# Patient Record
Sex: FEMALE | Race: WHITE | Employment: UNEMPLOYED | URBAN - METROPOLITAN AREA
[De-identification: names, ages, dates, MRNs, and addresses within clinical notes are randomized per-mention and may not be internally consistent; named-entity substitution may affect disease eponyms.]

---

## 2018-05-25 ENCOUNTER — APPOINTMENT (OUTPATIENT)
Dept: PHYSICAL THERAPY | Age: 38
End: 2018-05-25

## 2018-06-05 ENCOUNTER — HOSPITAL ENCOUNTER (OUTPATIENT)
Dept: PHYSICAL THERAPY | Age: 38
Discharge: HOME | End: 2018-06-05
Payer: COMMERCIAL

## 2018-06-05 PROCEDURE — 97161 PT EVAL LOW COMPLEX 20 MIN: CPT

## 2018-06-05 PROCEDURE — 97110 THERAPEUTIC EXERCISES: CPT

## 2018-06-05 PROCEDURE — 97530 THERAPEUTIC ACTIVITIES: CPT

## 2018-06-05 PROCEDURE — 97140 MANUAL THERAPY 1/> REGIONS: CPT

## 2018-06-05 NOTE — PROGRESS NOTES
PT DAILY TREATMENT NOTE     Patient Name: George Sifuentes  Date:2018  : 1980  [x]  Patient  Verified  Payor: Alexandre Lal / Plan: [employer] / Product Type: HMO /    In time:1100  Out time:1135  Total Treatment Time (min): 35  Visit #: 1 of 10    Treatment Area: Fibromyalgia [M79.7]    SUBJECTIVE  Pain Level (0-10 scale): 3  Any medication changes, allergies to medications, adverse drug reactions, diagnosis change, or new procedure performed?: [x] No    [] Yes (see summary sheet for update)  Subjective functional status/changes:   [] No changes reported      OBJECTIVE    Modality rationale:    Min Type Additional Details    [] Estim:  []Unatt       []IFC  []Premod                        []Other:  []w/ice   []w/heat  Position:  Location:    [] Estim: []Att    []TENS instruct  []NMES                    []Other:  []w/US   []w/ice   []w/heat  Position:  Location:    []  Traction: [] Cervical       []Lumbar                       [] Prone          []Supine                       []Intermittent   []Continuous Lbs:  [] before manual  [] after manual    []  Ultrasound: []Continuous   [] Pulsed                           []1MHz   []3MHz W/cm2:  Location:    []  Iontophoresis with dexamethasone         Location: [] Take home patch   [] In clinic    []  Ice     []  heat  []  Ice massage  []  Laser   []  Anodyne Position:  Location:    []  Laser with stim  []  Other:  Position:  Location:    []  Vasopneumatic Device Pressure:       [] lo [] med [] hi   Temperature: [] lo [] med [] hi   [] Skin assessment post-treatment:  []intact []redness- no adverse reaction    []redness  adverse reaction:     19 min [x]Eval                  []Re-Eval       8 min Therapeutic Activity:  []  See flow sheet :   Rationale: Patient education on benefits of aquatic therapy for diagnosis  to improve the patients ability to improve overall function     8 min Manual Therapy:  Pelvic alignment assessment, Right ilial upslip MET Rationale: decrease pain, increase ROM and increase tissue extensibility to normalize pelvic alignment          With   [] TE   [] TA   [] neuro   [] other: Patient Education: [x] Review HEP    [] Progressed/Changed HEP based on:   [] positioning   [] body mechanics   [] transfers   [] heat/ice application    [] other:      Other Objective/Functional Measures:      Pain Level (0-10 scale) post treatment: 3    ASSESSMENT/Changes in Function:     Patient will continue to benefit from skilled PT services to modify and progress therapeutic interventions, address functional mobility deficits, address ROM deficits, address strength deficits, analyze and address soft tissue restrictions, analyze and cue movement patterns, analyze and modify body mechanics/ergonomics, assess and modify postural abnormalities, address imbalance/dizziness and instruct in home and community integration to attain remaining goals.      [x]  See Plan of Care  []  See progress note/recertification  []  See Discharge Summary         Progress towards goals / Updated goals:  See POC    PLAN  []  Upgrade activities as tolerated     [x]  Continue plan of care  []  Update interventions per flow sheet       []  Discharge due to:_  []  Other:_      Ricky Nuñez, PT 6/5/2018  11:40 AM    Future Appointments  Date Time Provider Sabina Sanchez   6/7/2018 11:15 AM POOL [facility]   6/12/2018 12:00 PM [facility]   6/14/2018 11:30 AM POOL [facility]OURCE [facility]   6/19/2018 11:30 AM POOL [facility]   6/21/2018 10:45 AM POOL [facility]OURCE [facility]   6/26/2018 11:30 AM POOL [facility]   6/28/2018 12:00 PM POOL [facility]OURCE [facility]

## 2018-06-05 NOTE — PROGRESS NOTES
[facility], [address] [place] [address]  [phone] fax  Plan of Care/ Statement of Necessity for Physical Therapy Services     Patient name: Meagan Keith Start of Care: 2018   Referral source: Kassandra Nagy MD : 1980    Medical Diagnosis: Fibromyalgia [M79.7]   Onset Date:18    Treatment Diagnosis: generalized pain/instability   Prior Hospitalization: see medical history Provider#: [id number]   Medications: Verified on Patient summary List    Comorbidities: alcohol/tobacco use, high blood pressure, fibromyalgia, chronic fatigue syndrome   Prior Level of Function: Functionally independent, lives with  and three kids in multilevel home, works as / at [address] [place] [address] and following information is based on the information from the initial evaluation. Assessment/ key information: Patient is a Right handed 42year old female who presents with complaints of chronic pain and muscle instability over several years. 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 Additionally, she reports increasing muscle pain/cramps, particularly in her low back and Right arm, that began this past August. She was diagnosed with fibromyalgia this past February. Pain with daily tasks but able to complete ADL/care for children/work. At evaluation Patient demonstrates grossly full extremity and spinal AROM with some pain, as well as full strength in the extremities, with some impaired core activation. Right ilial upslip corrected with MET. Tender to palpation in the spinal musculature. Overall Patient is a good rehab candidate based on premorbid status, and will benefit from skilled physical therapy in order to manage pain and improve ease of daily tasks.       Evaluation Complexity History MEDIUM  Complexity : 1-2 comorbidities / personal factors will impact the outcome/ POC ; Examination LOW Complexity : 1-2 Standardized tests and measures addressing body structure, function, activity limitation and / or participation in recreation  ;Presentation LOW Complexity : Stable, uncomplicated  ;Clinical Decision Making MEDIUM Complexity : FOTO score of 26-74  Overall Complexity Rating: LOW   Problem List: pain affecting function, decrease ROM, decrease strength, edema affecting function, impaired gait/ balance, decrease ADL/ functional abilitiies, decrease activity tolerance, decrease flexibility/ joint mobility and decrease transfer abilities   Treatment Plan may include any combination of the following: Therapeutic exercise, Therapeutic activities, Neuromuscular re-education, Physical agent/modality, Gait/balance training, Manual therapy, Aquatic therapy, Patient education, Self Care training, Functional mobility training, Home safety training and Stair training  Patient / Family readiness to learn indicated by: asking questions, trying to perform skills and interest  Persons(s) to be included in education: patient (P)  Barriers to Learning/Limitations: None  Patient Goal (s): reduce pain  Patient Self Reported Health Status: fair  Rehabilitation Potential: good    Short Term Goals: To be accomplished in 1 weeks:  Goal: Patient will initiate aquatic therapy without incident or increase in pain in order to progress toward long term goals. Status at last note/certification: n/a  Long Term Goals: To be accomplished in 10 treatments:  Goal: Patient will improve FOTO assessment score to 65 in order to indicate improved functional abilities. Status at last note/certification: 55  Goal: Patient will report no increase in pain/cramping with full lumbar AROM in order to improve ease of daily tasks.   Status at last note/certification: Increasing pain/craming with left lateral flexion  Goal: Patient will report worst overall pain as 5/10 or less in order to progress toward personal goals.  Status at last note/certification: 6/01  Goal: Patient will report overall at least 50% improvement in function in order to improve ease of job tasks. Status at last note/certification: n/a    Frequency / Duration: Patient to be seen 2 times per week for 10 treatments. Patient/ Caregiver education and instruction: Diagnosis, prognosis, other education on benefits of aquatic therapy   [x]  Plan of care has been reviewed with ALPA Rahman, PT 6/5/2018 11:41 AM  _____________________________________________________________________  I certify that the above Therapy Services are being furnished while the patient is under my care. I agree with the treatment plan and certify that this therapy is necessary.     Physician's Signature:____________________  Date:__________Time:______    Please sign and return to [facility] [address]  [phone] fax

## 2018-06-07 ENCOUNTER — HOSPITAL ENCOUNTER (OUTPATIENT)
Dept: PHYSICAL THERAPY | Age: 38
Discharge: HOME | End: 2018-06-07
Payer: COMMERCIAL

## 2018-06-07 PROCEDURE — 97113 AQUATIC THERAPY/EXERCISES: CPT

## 2018-06-08 ENCOUNTER — APPOINTMENT (OUTPATIENT)
Dept: PHYSICAL THERAPY | Age: 38
End: 2018-06-08
Payer: COMMERCIAL

## 2018-06-12 ENCOUNTER — HOSPITAL ENCOUNTER (OUTPATIENT)
Dept: PHYSICAL THERAPY | Age: 38
Discharge: HOME | End: 2018-06-12
Payer: COMMERCIAL

## 2018-06-12 PROCEDURE — 97113 AQUATIC THERAPY/EXERCISES: CPT

## 2018-06-14 ENCOUNTER — HOSPITAL ENCOUNTER (OUTPATIENT)
Dept: PHYSICAL THERAPY | Age: 38
Discharge: HOME | End: 2018-06-14
Payer: COMMERCIAL

## 2018-06-14 PROCEDURE — 97113 AQUATIC THERAPY/EXERCISES: CPT

## 2018-06-19 ENCOUNTER — HOSPITAL ENCOUNTER (OUTPATIENT)
Dept: PHYSICAL THERAPY | Age: 38
Discharge: HOME | End: 2018-06-19
Payer: COMMERCIAL

## 2018-06-19 PROCEDURE — 97113 AQUATIC THERAPY/EXERCISES: CPT | Performed by: PHYSICAL THERAPIST

## 2018-06-21 ENCOUNTER — HOSPITAL ENCOUNTER (OUTPATIENT)
Dept: PHYSICAL THERAPY | Age: 38
Discharge: HOME | End: 2018-06-21
Payer: COMMERCIAL

## 2018-06-21 PROCEDURE — 97113 AQUATIC THERAPY/EXERCISES: CPT

## 2018-06-26 ENCOUNTER — HOSPITAL ENCOUNTER (OUTPATIENT)
Dept: PHYSICAL THERAPY | Age: 38
Discharge: HOME | End: 2018-06-26
Payer: COMMERCIAL

## 2018-06-26 PROCEDURE — 97113 AQUATIC THERAPY/EXERCISES: CPT

## 2018-06-28 ENCOUNTER — HOSPITAL ENCOUNTER (OUTPATIENT)
Dept: PHYSICAL THERAPY | Age: 38
Discharge: HOME | End: 2018-06-28
Payer: COMMERCIAL

## 2018-06-28 PROCEDURE — 97113 AQUATIC THERAPY/EXERCISES: CPT

## 2018-07-03 ENCOUNTER — HOSPITAL ENCOUNTER (OUTPATIENT)
Dept: PHYSICAL THERAPY | Age: 38
Discharge: HOME | End: 2018-07-03
Payer: COMMERCIAL

## 2018-07-03 PROCEDURE — 97113 AQUATIC THERAPY/EXERCISES: CPT

## 2018-07-05 ENCOUNTER — HOSPITAL ENCOUNTER (OUTPATIENT)
Dept: PHYSICAL THERAPY | Age: 38
Discharge: HOME | End: 2018-07-05
Payer: COMMERCIAL

## 2018-07-05 PROCEDURE — 97113 AQUATIC THERAPY/EXERCISES: CPT

## 2018-07-10 ENCOUNTER — HOSPITAL ENCOUNTER (OUTPATIENT)
Dept: PHYSICAL THERAPY | Age: 38
Discharge: HOME | End: 2018-07-10
Payer: COMMERCIAL

## 2018-07-10 PROCEDURE — 97113 AQUATIC THERAPY/EXERCISES: CPT

## 2018-07-12 ENCOUNTER — HOSPITAL ENCOUNTER (OUTPATIENT)
Dept: PHYSICAL THERAPY | Age: 38
Discharge: HOME | End: 2018-07-12
Payer: COMMERCIAL

## 2018-07-12 PROCEDURE — 97113 AQUATIC THERAPY/EXERCISES: CPT

## 2018-07-17 ENCOUNTER — HOSPITAL ENCOUNTER (OUTPATIENT)
Dept: PHYSICAL THERAPY | Age: 38
Discharge: HOME | End: 2018-07-17
Payer: COMMERCIAL

## 2018-07-17 PROCEDURE — 97113 AQUATIC THERAPY/EXERCISES: CPT

## 2018-07-19 ENCOUNTER — HOSPITAL ENCOUNTER (OUTPATIENT)
Dept: PHYSICAL THERAPY | Age: 38
Discharge: HOME | End: 2018-07-19
Payer: COMMERCIAL

## 2018-07-19 PROCEDURE — 97113 AQUATIC THERAPY/EXERCISES: CPT

## 2018-07-24 ENCOUNTER — HOSPITAL ENCOUNTER (OUTPATIENT)
Dept: PHYSICAL THERAPY | Age: 38
Discharge: HOME | End: 2018-07-24
Payer: COMMERCIAL

## 2018-07-24 PROCEDURE — 97113 AQUATIC THERAPY/EXERCISES: CPT

## 2018-07-26 ENCOUNTER — HOSPITAL ENCOUNTER (OUTPATIENT)
Dept: PHYSICAL THERAPY | Age: 38
Discharge: HOME | End: 2018-07-26
Payer: COMMERCIAL

## 2018-07-26 PROCEDURE — 97113 AQUATIC THERAPY/EXERCISES: CPT

## 2018-07-31 ENCOUNTER — HOSPITAL ENCOUNTER (OUTPATIENT)
Dept: PHYSICAL THERAPY | Age: 38
Discharge: HOME | End: 2018-07-31
Payer: COMMERCIAL

## 2018-07-31 PROCEDURE — 97113 AQUATIC THERAPY/EXERCISES: CPT

## 2018-08-03 NOTE — PROGRESS NOTES
[facility] 
[address] [facility], [address] 
[phone] fax Discharge Summary Patient name: Stephan Stevens Start of Care: 2018 Referral source: Iman Cai MD : 1980                         
Medical Diagnosis: Fibromyalgia [M79.7] Onset Date:18                         
Treatment Diagnosis: generalized pain/instability Prior Hospitalization: see medical history Provider#: [id number] Medications: Verified on Patient summary List  
 Comorbidities: alcohol/tobacco use, high blood pressure, fibromyalgia, chronic fatigue syndrome 
 Prior Level of Function: Functionally independent, lives with  and three kids in multilevel home, works as / at [employer] Visits from Start of Care: 17    Missed Visits: 0 Reporting Period : 18 to 8/3/18 Goal: Patient will improve FOTO assessment score to 65 in order to indicate improved functional abilities. Status at last note/certification: FOTO 46 pts due to flare up Current status: not met - FOTO 44 pts Goal: Patient will report no increase in pain/cramping with full lumbar AROM in order to improve ease of daily tasks. Status at last note/certification: cramping episodes with S/B left 2x/wk now vs daily but still 9/10 pain when occurs Current status: progressing - cramping only 2x/week now Goal: Patient will report worst overall pain as 5/10 or less for ease with donning shoes. Status at last note/certification:  pain at worst with certain movements but not consistent now; 6/10 on average Current status: not met - 8/10 pain at worst 
Goal: Patient will report overall at least 75% improvement in function for ease reaching overhead for job duties. Status at last note/certification: 98% improved Current status: met - 78% improvement since start of care Assessment/ Summary of Care: Pt made good progress with therapy measures.   Pt reports improvement in overall function, less episodes of cramping in lower back and ability to perform ADLs without as much difficulty now. Pt continues to have pain without pattern although less frequent. Pt is compliant with HEP and aquatics program and will now transition to independent management in the aquatics post-rehab program.  Thank you for this referral. 
 
RECOMMENDATIONS: 
[x]Discontinue therapy: [x]Patient has reached or is progressing toward set goals []Patient is non-compliant or has abdicated 
    []Due to lack of appreciable progress towards set goals Gaston Maya, PT 8/3/2018 12:13 PM